# Patient Record
Sex: FEMALE | Race: WHITE | Employment: OTHER | URBAN - METROPOLITAN AREA
[De-identification: names, ages, dates, MRNs, and addresses within clinical notes are randomized per-mention and may not be internally consistent; named-entity substitution may affect disease eponyms.]

---

## 2017-10-10 ENCOUNTER — HOSPITAL ENCOUNTER (OUTPATIENT)
Dept: GENERAL RADIOLOGY | Age: 67
Discharge: HOME | End: 2017-10-10
Attending: FAMILY MEDICINE
Payer: MEDICARE

## 2017-10-10 DIAGNOSIS — M47.812 CERVICAL ARTHRITIS: ICD-10-CM

## 2017-10-10 PROCEDURE — 72050 X-RAY EXAM NECK SPINE 4/5VWS: CPT

## 2022-07-28 ENCOUNTER — DOCUMENTATION ONLY (OUTPATIENT)
Dept: FAMILY MEDICINE CLINIC | Age: 72
End: 2022-07-28

## 2022-07-28 ENCOUNTER — OFFICE VISIT (OUTPATIENT)
Dept: FAMILY MEDICINE CLINIC | Age: 72
End: 2022-07-28
Payer: MEDICARE

## 2022-07-28 VITALS
HEART RATE: 76 BPM | HEIGHT: 62 IN | BODY MASS INDEX: 20.02 KG/M2 | WEIGHT: 108.8 LBS | SYSTOLIC BLOOD PRESSURE: 139 MMHG | TEMPERATURE: 98.1 F | OXYGEN SATURATION: 97 % | DIASTOLIC BLOOD PRESSURE: 77 MMHG

## 2022-07-28 DIAGNOSIS — E78.2 MODERATE MIXED HYPERLIPIDEMIA NOT REQUIRING STATIN THERAPY: ICD-10-CM

## 2022-07-28 DIAGNOSIS — R19.5 CHANGE IN STOOL: ICD-10-CM

## 2022-07-28 DIAGNOSIS — I10 PRIMARY HYPERTENSION: Primary | ICD-10-CM

## 2022-07-28 DIAGNOSIS — Z76.89 ENCOUNTER TO ESTABLISH CARE: ICD-10-CM

## 2022-07-28 LAB
ALBUMIN SERPL-MCNC: 4 G/DL (ref 3.5–5)
ALBUMIN/GLOB SERPL: 1.3 {RATIO} (ref 1.1–2.2)
ALP SERPL-CCNC: 72 U/L (ref 45–117)
ALT SERPL-CCNC: 40 U/L (ref 12–78)
ANION GAP SERPL CALC-SCNC: 4 MMOL/L (ref 5–15)
AST SERPL-CCNC: 22 U/L (ref 15–37)
BASOPHILS # BLD: 0 K/UL (ref 0–0.1)
BASOPHILS NFR BLD: 0 % (ref 0–1)
BILIRUB SERPL-MCNC: 0.6 MG/DL (ref 0.2–1)
BUN SERPL-MCNC: 26 MG/DL (ref 6–20)
BUN/CREAT SERPL: 23 (ref 12–20)
CALCIUM SERPL-MCNC: 9.5 MG/DL (ref 8.5–10.1)
CHLORIDE SERPL-SCNC: 106 MMOL/L (ref 97–108)
CHOLEST SERPL-MCNC: 210 MG/DL
CO2 SERPL-SCNC: 28 MMOL/L (ref 21–32)
CREAT SERPL-MCNC: 1.11 MG/DL (ref 0.55–1.02)
DIFFERENTIAL METHOD BLD: NORMAL
EOSINOPHIL # BLD: 0.1 K/UL (ref 0–0.4)
EOSINOPHIL NFR BLD: 1 % (ref 0–7)
ERYTHROCYTE [DISTWIDTH] IN BLOOD BY AUTOMATED COUNT: 13.4 % (ref 11.5–14.5)
GLOBULIN SER CALC-MCNC: 3.1 G/DL (ref 2–4)
GLUCOSE SERPL-MCNC: 102 MG/DL (ref 65–100)
HCT VFR BLD AUTO: 41.7 % (ref 35–47)
HDLC SERPL-MCNC: 85 MG/DL
HDLC SERPL: 2.5 {RATIO} (ref 0–5)
HGB BLD-MCNC: 13.2 G/DL (ref 11.5–16)
IMM GRANULOCYTES # BLD AUTO: 0 K/UL (ref 0–0.04)
IMM GRANULOCYTES NFR BLD AUTO: 0 % (ref 0–0.5)
LDLC SERPL CALC-MCNC: 109 MG/DL (ref 0–100)
LYMPHOCYTES # BLD: 1 K/UL (ref 0.8–3.5)
LYMPHOCYTES NFR BLD: 20 % (ref 12–49)
MCH RBC QN AUTO: 30 PG (ref 26–34)
MCHC RBC AUTO-ENTMCNC: 31.7 G/DL (ref 30–36.5)
MCV RBC AUTO: 94.8 FL (ref 80–99)
MONOCYTES # BLD: 0.4 K/UL (ref 0–1)
MONOCYTES NFR BLD: 8 % (ref 5–13)
NEUTS SEG # BLD: 3.7 K/UL (ref 1.8–8)
NEUTS SEG NFR BLD: 71 % (ref 32–75)
NRBC # BLD: 0 K/UL (ref 0–0.01)
NRBC BLD-RTO: 0 PER 100 WBC
PLATELET # BLD AUTO: 229 K/UL (ref 150–400)
PMV BLD AUTO: 9.7 FL (ref 8.9–12.9)
POTASSIUM SERPL-SCNC: 4.8 MMOL/L (ref 3.5–5.1)
PROT SERPL-MCNC: 7.1 G/DL (ref 6.4–8.2)
RBC # BLD AUTO: 4.4 M/UL (ref 3.8–5.2)
SODIUM SERPL-SCNC: 138 MMOL/L (ref 136–145)
TRIGL SERPL-MCNC: 80 MG/DL (ref ?–150)
TSH SERPL DL<=0.05 MIU/L-ACNC: 1.07 UIU/ML (ref 0.36–3.74)
VLDLC SERPL CALC-MCNC: 16 MG/DL
WBC # BLD AUTO: 5.2 K/UL (ref 3.6–11)

## 2022-07-28 PROCEDURE — 1123F ACP DISCUSS/DSCN MKR DOCD: CPT | Performed by: FAMILY MEDICINE

## 2022-07-28 PROCEDURE — 99204 OFFICE O/P NEW MOD 45 MIN: CPT | Performed by: FAMILY MEDICINE

## 2022-07-28 PROCEDURE — G0463 HOSPITAL OUTPT CLINIC VISIT: HCPCS | Performed by: FAMILY MEDICINE

## 2022-07-28 RX ORDER — LISINOPRIL 10 MG/1
TABLET ORAL DAILY
COMMUNITY

## 2022-07-28 NOTE — PROGRESS NOTES
Faxed Records Release form to [facility] (fax# [phone]). Confirmation received. Faxed Records Release form to [facility] (fax# [phone]). Confirmation received. Faxed Records Release form to [facility] (fax# [phone]). Confirmation received.

## 2022-07-28 NOTE — PROGRESS NOTES
Harvey Izquierdo is a 71 y.o. female , id x 2(name and ). Reviewed record, history, and  medications. Chief Complaint   Patient presents with    Establish Care     Previous PCP retired. Diarrhea     Pt has no other concerns. Vitals:    22 0742   BP: 139/77   Pulse: 76   Temp: 98.1 °F (36.7 °C)   TempSrc: Oral   SpO2: 97%   Weight: 108 lb 12.8 oz (49.4 kg)   Height: 5' 1.5\" (1.562 m)       Coordination of Care Questionnaire:   1. Have you been to the ER, urgent care clinic since your last visit? Hospitalized since your last visit? Yes The [address] [place] [address] in [facility] Never     2. Have you seen or consulted any other health care providers outside of the [address] [place] [address] since your last visit? Include any pap smears or colon screening.  No

## 2022-07-28 NOTE — PROGRESS NOTES
Suha Burks (: 1950) is a 71 y.o. female, new patient, here for evaluation of the following chief complaint(s):  Establish Care (Previous PCP retired. ) and Diarrhea         ASSESSMENT/PLAN:  Below is the assessment and plan developed based on review of pertinent history, physical exam, labs, studies, and medications. 1. Primary hypertension  Blood pressure is a little high in office   At goal with home monitoring   Continue lisinopril   Check labs today   Continue following DASH diet, low cholesterol diet   -     CBC WITH AUTOMATED DIFF; Future  -     METABOLIC PANEL, COMPREHENSIVE; Future  -     TSH 3RD GENERATION; Future    2. Moderate mixed hyperlipidemia not requiring statin therapy  Fasting labs today   Encourage patient to return to heart healthy, low cholesterol diet as she has not been following this for the last 2 months   -     METABOLIC PANEL, COMPREHENSIVE; Future  -     LIPID PANEL; Future    3. Change in stool  Minimal mucous in stool following bowel movements for last 2 months. Ddx includes secondary to possible COVID 3 months ago vs dietary changes vs thyroid disease vs other  Check labs   Encourage her to return to heart healthy diet. If labs OK and no improvement with dietary changes will refer to GI  -     TSH 3RD GENERATION; Future    4. Encounter to establish care  Records requested today for 2 previous PCPs as well as OBGYN     Return in about 3 months (around 10/28/2022) for follow up with PCP, records review. SUBJECTIVE/OBJECTIVE:  Chief Complaint   Patient presents with    Establish Care     Previous PCP retired. Diarrhea     Patient is a 52-year-old female with history of hypertension presenting the office to establish care. Previously at [address] [place] [address], her personal PCP retired. Seeing Jose Pearce since then at the office. She also went to [facility] for a few months and was seeing a nurse practitioner there.   She loved this nurse practitioner, but she is close to assisted and patient would like to establish with the practice that has providers who are not not about to retire. Hypertension: she has been taking lisinopril for several years. She previously was on hydrochlorothiazide [place] her to lisinopril. Blood pressure is always slightly elevated at the doctor's office. She recently did a 10-day at home blood pressure monitor which showed consistently less than 130/80. Her blood pressure this morning was 126/76. She had a colonoscopy last year- either February or march. She was having some issues with spotting/bleeding in her stool. It was a hemorrhoid. She follows with CHRIS adams for OBGYN, they also did bone scan. Unsure when her last labwork was, at least 9 months ago    Borderline high cholesterol and they wanted to put her on a statin in the past so patient changed her diet. She follows a very strict heart healthy low-cholesterol diet and exercises several days per week. She was able bring her cholesterol down with dietary and lifestyle changes and a statin was not recommended. There was a question of a kidney issue, little high at some point. She was not sent to nephrology, she is unsure exactly what this was. Patient states in the last few months she has a new issue having a stool. she notes about 3 months ago she had a upper respiratory infection, she did tested negative for COVID twice but she had a high fever, aches, fatigue and a cough and the symptoms lasted for about 6 weeks and she is given she had COVID. After this, 3 to 4 days/week after she has a bowel movement when she wipes she will feel a mucousy type consistency to her stool. She also notes she has increased flatulence and occasionally this is accompanied by a scant amount of thin mucus. She has 2 soft stools per day. They are soft, no straining or rectal pain.   She has no diarrhea, abdominal pain, fever, chills, night sweats, weight loss. No vaginal burning or discharge, no urinary frequency dysuria or hematuria. She does note for the last several weeks she has not been following her heart healthy diet as strictly as previously. She has been eating out more. A week ago she went back to her strict heart healthy diet and she has noticed that the stool symptoms have improved in the last week. She has had 1 or 2 episodes of BRBPR last 3 months with known hemorrhoid. Review of Systems   Constitutional:  Negative for chills, fatigue and fever. HENT:  Negative for hearing loss. Eyes:  Negative for pain and visual disturbance. Respiratory:  Negative for cough, chest tightness, shortness of breath and wheezing. Cardiovascular:  Negative for chest pain, palpitations and leg swelling. Gastrointestinal:  Negative for abdominal distention, abdominal pain, constipation, diarrhea, nausea and vomiting. Genitourinary:  Negative for dysuria, flank pain, frequency and hematuria. Musculoskeletal:  Negative for arthralgias, joint swelling and myalgias. Skin:  Negative for rash. Neurological:  Negative for dizziness, weakness, light-headedness and headaches. Psychiatric/Behavioral:  Negative for behavioral problems. The patient is not nervous/anxious. Current Outpatient Medications on File Prior to Visit   Medication Sig Dispense Refill    lisinopriL (PRINIVIL, ZESTRIL) 10 mg tablet Take  by mouth daily. No current facility-administered medications on file prior to visit. There are no problems to display for this patient.   Past Surgical History:   Procedure Laterality Date    HX CATARACT REMOVAL Bilateral 2016    HX SHOULDER ARTHROSCOPY Right 1981     Not on File  Social History     Tobacco Use    Smoking status: Never    Smokeless tobacco: Never   Vaping Use    Vaping Use: Never used   Substance Use Topics    Alcohol use: Yes     Comment: occassionally Drug use: Never     Family History   Problem Relation Age of Onset    Kidney Disease Mother     Colon Cancer Mother     Anemia Mother     Heart Disease Father     Heart Attack Father      Vitals:    07/28/22 0742   BP: 139/77   Pulse: 76   Temp: 98.1 °F (36.7 °C)   TempSrc: Oral   SpO2: 97%   Weight: 108 lb 12.8 oz (49.4 kg)   Height: 5' 1.5\" (1.562 m)   PainSc:   0 - No pain        Physical Exam  Constitutional:       Appearance: Normal appearance. HENT:      Head: Normocephalic and atraumatic. Eyes:      Extraocular Movements: Extraocular movements intact. Conjunctiva/sclera: Conjunctivae normal.      Pupils: Pupils are equal, round, and reactive to light. Cardiovascular:      Rate and Rhythm: Normal rate and regular rhythm. Pulses: Normal pulses. Heart sounds: Normal heart sounds. Pulmonary:      Effort: Pulmonary effort is normal.      Breath sounds: Normal breath sounds. Abdominal:      General: Abdomen is flat. Bowel sounds are normal. There is no distension. Palpations: Abdomen is soft. There is no mass. Tenderness: no abdominal tenderness There is no right CVA tenderness, left CVA tenderness or guarding. Musculoskeletal:      Right lower leg: No edema. Left lower leg: No edema. Neurological:      General: No focal deficit present. Mental Status: She is alert and oriented to person, place, and time. Psychiatric:         Mood and Affect: Mood normal.         Behavior: Behavior normal.         An electronic signature was used to authenticate this note.   -- Kirk Harvey PA-C

## 2022-07-29 ENCOUNTER — DOCUMENTATION ONLY (OUTPATIENT)
Dept: FAMILY MEDICINE CLINIC | Age: 72
End: 2022-07-29

## 2022-07-31 NOTE — PROGRESS NOTES
Please call patient and advise. Her cholesterol is a little high, but not to the level I would recommend starting medication. Return to heart healthy, low-cholesterol diet and we can recheck in 6 to 12 months. Thyroid is normal.    Her labs show her kidney function is slightly decreased and her creatinine is a little high. Creatinine is a measurement of kidney function. I recommend she avoid all NSAIDs (Motrin, Aleve, Advil) and stay well-hydrated and return to office in 1 month to recheck her kidney function. Hopefully we will have her previous PCP records at this point and we can compare these labs to her previous labs. Her blood counts are perfect. Recommend return to office in 1 month for recheck of kidney function and follow-up on stool changes. If stool changes have persisted despite returning to her low-cholesterol diet then will refer to GI.   Naida Pedro PA-C

## 2022-08-01 ENCOUNTER — TELEPHONE (OUTPATIENT)
Dept: FAMILY MEDICINE CLINIC | Age: 72
End: 2022-08-01

## 2022-08-01 NOTE — TELEPHONE ENCOUNTER
----- Message from Shy Stein PA-C sent at 7/31/2022  4:58 PM EDT -----  Please call patient and advise. Her cholesterol is a little high, but not to the level I would recommend starting medication. Return to heart healthy, low-cholesterol diet and we can recheck in 6 to 12 months. Thyroid is normal.    Her labs show her kidney function is slightly decreased and her creatinine is a little high. Creatinine is a measurement of kidney function. I recommend she avoid all NSAIDs (Motrin, Aleve, Advil) and stay well-hydrated and return to office in 1 month to recheck her kidney function. Hopefully we will have her previous PCP records at this point and we can compare these labs to her previous labs. Her blood counts are perfect. Recommend return to office in 1 month for recheck of kidney function and follow-up on stool changes. If stool changes have persisted despite returning to her low-cholesterol diet then will refer to GI.   Shy Stein PA-C

## 2022-08-01 NOTE — TELEPHONE ENCOUNTER
Called and spoke with pt. Pt id x 2 verified. Informed her of the previous message from [place], [place]. She verbalized understanding. Attempted to schedule her for 1 month  f/up appt for recheck kidney function and stool changes. Unsuccessful, unable to access appt schedule at this time. Informed her will call her back to get her scheduled. She verbalized understanding.

## 2022-08-02 NOTE — TELEPHONE ENCOUNTER
Future Appointments   Date Time Provider Gladys Cuellar   8/29/2022  3:15 PM Romario Curtis PA-C [facility]

## 2022-08-02 NOTE — PROGRESS NOTES
Per review of previous PCP and GYN records  Last mammogram 12/31/2022  Last colonoscopy 4/15/2021 with Dr. Karleen Favre hemorrhoids otherwise normal, repeat 5 years   Shingrex completed 10/5/2020

## 2022-08-09 ENCOUNTER — DOCUMENTATION ONLY (OUTPATIENT)
Dept: FAMILY MEDICINE CLINIC | Age: 72
End: 2022-08-09

## 2022-08-09 NOTE — PROGRESS NOTES
[facility] records were put on MICHAEL Senior's desk ( covering paperwork for PA [employer] )

## 2022-08-29 ENCOUNTER — OFFICE VISIT (OUTPATIENT)
Dept: FAMILY MEDICINE CLINIC | Age: 72
End: 2022-08-29
Payer: MEDICARE

## 2022-08-29 VITALS
WEIGHT: 109.9 LBS | HEART RATE: 73 BPM | OXYGEN SATURATION: 98 % | BODY MASS INDEX: 20.22 KG/M2 | TEMPERATURE: 98.2 F | HEIGHT: 62 IN | DIASTOLIC BLOOD PRESSURE: 74 MMHG | RESPIRATION RATE: 18 BRPM | SYSTOLIC BLOOD PRESSURE: 136 MMHG

## 2022-08-29 DIAGNOSIS — Z00.00 MEDICARE ANNUAL WELLNESS VISIT, SUBSEQUENT: Primary | ICD-10-CM

## 2022-08-29 DIAGNOSIS — N18.31 STAGE 3A CHRONIC KIDNEY DISEASE (HCC): ICD-10-CM

## 2022-08-29 DIAGNOSIS — I10 PRIMARY HYPERTENSION: ICD-10-CM

## 2022-08-29 DIAGNOSIS — R19.5 CHANGE IN STOOL: ICD-10-CM

## 2022-08-29 PROCEDURE — G8399 PT W/DXA RESULTS DOCUMENT: HCPCS | Performed by: FAMILY MEDICINE

## 2022-08-29 PROCEDURE — G0439 PPPS, SUBSEQ VISIT: HCPCS | Performed by: FAMILY MEDICINE

## 2022-08-29 PROCEDURE — G8432 DEP SCR NOT DOC, RNG: HCPCS | Performed by: FAMILY MEDICINE

## 2022-08-29 PROCEDURE — G9899 SCRN MAM PERF RSLTS DOC: HCPCS | Performed by: FAMILY MEDICINE

## 2022-08-29 PROCEDURE — G8427 DOCREV CUR MEDS BY ELIG CLIN: HCPCS | Performed by: FAMILY MEDICINE

## 2022-08-29 PROCEDURE — 99213 OFFICE O/P EST LOW 20 MIN: CPT | Performed by: FAMILY MEDICINE

## 2022-08-29 PROCEDURE — 1123F ACP DISCUSS/DSCN MKR DOCD: CPT | Performed by: FAMILY MEDICINE

## 2022-08-29 PROCEDURE — G8536 NO DOC ELDER MAL SCRN: HCPCS | Performed by: FAMILY MEDICINE

## 2022-08-29 PROCEDURE — 1101F PT FALLS ASSESS-DOCD LE1/YR: CPT | Performed by: FAMILY MEDICINE

## 2022-08-29 PROCEDURE — 3017F COLORECTAL CA SCREEN DOC REV: CPT | Performed by: FAMILY MEDICINE

## 2022-08-29 PROCEDURE — G0463 HOSPITAL OUTPT CLINIC VISIT: HCPCS | Performed by: FAMILY MEDICINE

## 2022-08-29 PROCEDURE — G8420 CALC BMI NORM PARAMETERS: HCPCS | Performed by: FAMILY MEDICINE

## 2022-08-29 RX ORDER — DOXYCYCLINE HYCLATE 100 MG
TABLET ORAL
COMMUNITY
Start: 2022-08-15

## 2022-08-29 NOTE — ACP (ADVANCE CARE PLANNING)
Advance Care Planning     General Advance Care Planning (ACP) Conversation      Date of Conversation: 8/29/2022  Conducted with: Patient with Decision Making Capacity    Healthcare Decision Maker:     Primary Decision Maker: Alexia Pearl - [facility] - [phone]  Click here to complete [address] including selection of the Healthcare Decision Maker Relationship (ie \"Primary\")  Today we documented Decision Maker(s) consistent with Legal Next of Kin hierarchy. Content/Action Overview: Has ACP document(s) NOT on file - requested patient to provide  Reviewed DNR/DNI and patient will review and make sure wishes are documented with advanced directive. She has advanced directive and living will and will bring documents by the office for PCP to have on file.      Length of Voluntary ACP Conversation in minutes:  <16 minutes (Non-Billable)    Tirso Handley PA-C

## 2022-08-29 NOTE — PATIENT INSTRUCTIONS
Gastrointestinal specialists: [phone] Gastroenterology: [phone]     Medicare Wellness Visit, Female     The best way to live healthy is to have a lifestyle where you eat a well-balanced diet, exercise regularly, limit alcohol use, and quit all forms of tobacco/nicotine, if applicable. Regular preventive services are another way to keep healthy. Preventive services (vaccines, screening tests, monitoring & exams) can help personalize your care plan, which helps you manage your own care. Screening tests can find health problems at the earliest stages, when they are easiest to treat. [facility] follows the current, evidence-based guidelines published by the [facility] Bruno ([facility]) when recommending preventive services for our patients. Because we follow these guidelines, sometimes recommendations change over time as research supports it. (For example, mammograms used to be recommended annually. Even though Medicare will still pay for an annual mammogram, the newer guidelines recommend a mammogram every two years for women of average risk). Of course, you and your doctor may decide to screen more often for some diseases, based on your risk and your co-morbidities (chronic disease you are already diagnosed with). Preventive services for you include:  - Medicare offers their members a free annual wellness visit, which is time for you and your primary care provider to discuss and plan for your preventive service needs. Take advantage of this benefit every year!  -All adults over the age of 72 should receive the recommended pneumonia vaccines. Current USPSTF guidelines recommend a series of two vaccines for the best pneumonia protection.   -All adults should have a flu vaccine yearly and a tetanus vaccine every 10 years.   -All adults age 48 and older should receive the shingles vaccines (series of two vaccines).       -All adults age 38-68 who are overweight should have a diabetes screening test once every three years.   -All adults born between [birth year] and 1965 should be screened once for Hepatitis C.  -Other screening tests and preventive services for persons with diabetes include: an eye exam to screen for diabetic retinopathy, a kidney function test, a foot exam, and stricter control over your cholesterol.   -Cardiovascular screening for adults with routine risk involves an electrocardiogram (ECG) at intervals determined by your doctor.   -Colorectal cancer screenings should be done for adults age 54-65 with no increased risk factors for colorectal cancer. There are a number of acceptable methods of screening for this type of cancer. Each test has its own benefits and drawbacks. Discuss with your doctor what is most appropriate for you during your annual wellness visit. The different tests include: colonoscopy (considered the best screening method), a fecal occult blood test, a fecal DNA test, and sigmoidoscopy.    -A bone mass density test is recommended when a woman turns 65 to screen for osteoporosis. This test is only recommended one time, as a screening. Some providers will use this same test as a disease monitoring tool if you already have osteoporosis. -Breast cancer screenings are recommended every other year for women of normal risk, age 54-69.  -Cervical cancer screenings for women over age 72 are only recommended with certain risk factors.      Here is a list of your current Health Maintenance items (your personalized list of preventive services) with a due date:  Health Maintenance Due   Topic Date Due    Hepatitis C Test  Never done    COVID-19 Vaccine (1) Never done    Colorectal Screening  Never done    Shingles Vaccine (1 of 2) Never done    Bone Mineral Density   Never done    DTaP/Tdap/Td  (2 - Td or Tdap) 07/23/2020

## 2022-08-29 NOTE — PROGRESS NOTES
Ann Blanco is a 71 y.o. female , id x 2(name and ). Reviewed record, history, and  medications. Chief Complaint   Patient presents with    Follow-up     The bowel issue has gotten a little better. BP this morning was 120/75 and Pulse was 77. Vitals:    22 1532 22 1535   BP: (!) 147/77 136/74   Pulse: 73    Resp: 18    Temp: 98.2 °F (36.8 °C)    TempSrc: Oral    SpO2: 98%    Weight: 109 lb 14.4 oz (49.9 kg)    Height: 5' 1.5\" (1.562 m)        Coordination of Care Questionnaire:   1. Have you been to the ER, urgent care clinic since your last visit? Hospitalized since your last visit? No    2. Have you seen or consulted any other health care providers outside of the [address] [place] [address] since your last visit? Include any pap smears or colon screening. Yes Dr. Sridevi Putnam at [facility].

## 2022-08-29 NOTE — PROGRESS NOTES
[address]ia [address] Terrell [address] (: 1950) is a 71 y.o. female, established patient, here for evaluation of the following chief complaint(s):  Follow-up (The bowel issue has gotten a little better. /BP this morning was 120/75 and Pulse was 77. )     This is the Subsequent Medicare Annual Wellness Exam, performed 12 months or more after the Initial AWV or the last Subsequent AWV    I have reviewed the patient's medical history in detail and updated the computerized patient record. Assessment/Plan   Education and counseling provided:  Are appropriate based on today's review and evaluation  Pneumococcal Vaccine  Influenza Vaccine  Screening Mammography  Screening Pap and pelvic (covered once every 2 years)  Colorectal cancer screening tests  Bone mass measurement (DEXA)    1. Medicare annual wellness visit, subsequent  Up to date on mammo, pap and dexa with OBGYN [facility] [address]  Up to take on prevnar and shingrex   Up to date on colonoscopy     Depression Risk Factor Screening     3 most recent PHQ Screens 2022   Little interest or pleasure in doing things Not at all   Feeling down, depressed, irritable, or hopeless Not at all   Total Score PHQ 2 0       Alcohol & Drug Abuse Risk Screen    Do you average more than 1 drink per night or more than 7 drinks a week:  No    On any one occasion in the past three months have you have had more than 3 drinks containing alcohol:  No          Functional Ability and Level of Safety    Hearing: Hearing is good. Activities of Daily Living: The home contains: handrails and grab bars  Patient does total self care      Ambulation: with no difficulty     Fall Risk:  Fall Risk Assessment, last 12 mths 2022   Able to walk? Yes   Fall in past 12 months? 0   Do you feel unsteady?  0   Are you worried about falling 0      Abuse Screen:  Patient is not abused       Cognitive Screening    Has your family/caregiver stated any concerns about your memory: no     Cognitive Screening: Normal - Mini Cog Test    Health Maintenance Due     Health Maintenance Due   Topic Date Due    Hepatitis C Screening  Never done    COVID-19 Vaccine (1) Never done    Colorectal Cancer Screening Combo  Never done    Shingrix Vaccine Age 50> (1 of 2) Never done    Bone Densitometry (Dexa) Screening  Never done    DTaP/Tdap/Td series (2 - Td or Tdap) 07/23/2020       Patient Care Team   Patient Care Team:  Edd Kelley as PCP - General (Physician Assistant)  Edd Kelley as PCP - [address] [place] [address]  [facility] Provider  Marin Gong MD (Family Medicine)    History     Patient Active Problem List   Diagnosis Code    Primary hypertension I10    Moderate mixed hyperlipidemia not requiring statin therapy E78.2     History reviewed. No pertinent past medical history. Past Surgical History:   Procedure Laterality Date    HX CATARACT REMOVAL Bilateral 2016    HX SHOULDER ARTHROSCOPY Right 1981     Current Outpatient Medications   Medication Sig Dispense Refill    doxycycline (VIBRA-TABS) 100 mg tablet TAKE 1 TABLET BY MOUTH TWICE A DAY FOR 30 DAYS      lisinopriL (PRINIVIL, ZESTRIL) 10 mg tablet Take  by mouth daily.        Not on File    Family History   Problem Relation Age of Onset    Kidney Disease Mother     Colon Cancer Mother     Anemia Mother     Heart Disease Father     Heart Attack Father      Social History     Tobacco Use    Smoking status: Never    Smokeless tobacco: Never   Substance Use Topics    Alcohol use: Yes     Comment: occassionally         Genia Michelle PA-C

## 2022-08-30 NOTE — PROGRESS NOTES
Bibiana Joshua 20 (: 1950) is a 71 y.o. female, established patient, here for evaluation of the following chief complaint(s):  Follow-up (The bowel issue has gotten a little better. /BP this morning was 120/75 and Pulse was 77. )         ASSESSMENT/PLAN:  Below is the assessment and plan developed based on review of pertinent history, physical exam, labs, studies, and medications. 1. Change in stool  Some improvement with avoidance of chocolate and greasy foods however she is still experiencing mucous in stool and incontinence of mucous after passing gas. Refer to GI. She is already established patient at [facility]    2. Stage 3a chronic kidney disease ([address]  Cr improved from 1.2 with labs with previous PCP 8 months ago to 1.11 with labs 1 month ago   Continue to avoid NSAIDS and stay well hydrated   Continue to monitor with q 6 mo labs     3. Primary hypertension   At goal in office continue lisinopril 10mg daily    Return in about 6 months (around 2023). SUBJECTIVE/OBJECTIVE:  Chief Complaint   Patient presents with    Follow-up     The bowel issue has gotten a little better. BP this morning was 120/75 and Pulse was 77. Patient presents to office for change in stool consistency. GI sx have improved some but not resolved. If she has gas occasionally and expell said gas-- liquid comes out sometimes it is a clear mucous, other times yellow liquid. All started after COVID-like URI 2 months ago with negative COVID tests. She has tried to return to heart healthy strict diet but has not completely made these changes. She is trying to see if certain foods trigger GI symptoms. Chocolate definitely triggers mucous in stool. Sometimes its a mucous, othertimes thin and yellow. Still happening over the last month. Most every day. She had a colonoscopy a year and a half ago. RGSIMEON Lopez.        She does not want statin for hyperlipidemia     Previous hep c testing with previous PCP     Has had both pneumonia and both shingles-- prevnar last one in may     Per review of previous PCP and GYN records  Last mammogram 12/31/2022  Last colonoscopy 4/15/2021 with Dr. Dominik Kendall hemorrhoids otherwise normal, repeat 5 years   Shingrex completed 10/5/2020  DEXA by OBGYN over a year ago    Review of systems negative other than mentioned in HPI    Current Outpatient Medications on File Prior to Visit   Medication Sig Dispense Refill    doxycycline (VIBRA-TABS) 100 mg tablet TAKE 1 TABLET BY MOUTH TWICE A DAY FOR 30 DAYS      lisinopriL (PRINIVIL, ZESTRIL) 10 mg tablet Take  by mouth daily. No current facility-administered medications on file prior to visit. Vitals:    08/29/22 1532 08/29/22 1535   BP: (!) 147/77 136/74   Pulse: 73    Resp: 18    Temp: 98.2 °F (36.8 °C)    TempSrc: Oral    SpO2: 98%    Weight: 109 lb 14.4 oz (49.9 kg)    Height: 5' 1.5\" (1.562 m)    PainSc:   0 - No pain         Physical Exam  Constitutional:       Appearance: Normal appearance. HENT:      Head: Normocephalic and atraumatic. Eyes:      Extraocular Movements: Extraocular movements intact. Conjunctiva/sclera: Conjunctivae normal.      Pupils: Pupils are equal, round, and reactive to light. Cardiovascular:      Rate and Rhythm: Normal rate and regular rhythm. Pulses: Normal pulses. Heart sounds: Normal heart sounds. Pulmonary:      Effort: Pulmonary effort is normal.      Breath sounds: Normal breath sounds. Abdominal:      General: Abdomen is flat. Bowel sounds are normal.      Palpations: Abdomen is soft. Neurological:      Mental Status: She is alert. Psychiatric:         Mood and Affect: Mood normal.         Behavior: Behavior normal.       An electronic signature was used to authenticate this note.       -- Rosy Hinojosa PA-C

## 2023-01-04 ENCOUNTER — TELEPHONE (OUTPATIENT)
Dept: FAMILY MEDICINE CLINIC | Age: 73
End: 2023-01-04

## 2023-01-04 NOTE — TELEPHONE ENCOUNTER
Called and spoke with pt. Pt id x 2. She states she's unsure but she thinks she need blood work done for her blood pressure medication. She would like to schedule an appt. Appt scheduled for 1/9/23 at 8:40 am. She verbalized understanding.

## 2023-01-04 NOTE — TELEPHONE ENCOUNTER
----- Message from [employer] sent at 1/4/2023  2:05 PM EST -----  Subject: Referral Request    Reason for referral request? lab work for blood pressure  Provider patient wants to be referred to(if known):     Provider Phone Number(if known): Additional Information for Provider?  please call when orders have been put   in for this  ---------------------------------------------------------------------------  --------------  [id number] [employer]    [phone]; OK to leave message on voicemail  ---------------------------------------------------------------------------  --------------

## 2023-01-09 ENCOUNTER — OFFICE VISIT (OUTPATIENT)
Dept: FAMILY MEDICINE CLINIC | Age: 73
End: 2023-01-09
Payer: MEDICARE

## 2023-01-09 VITALS
HEIGHT: 62 IN | OXYGEN SATURATION: 98 % | SYSTOLIC BLOOD PRESSURE: 138 MMHG | RESPIRATION RATE: 16 BRPM | DIASTOLIC BLOOD PRESSURE: 74 MMHG | BODY MASS INDEX: 20.72 KG/M2 | HEART RATE: 70 BPM | WEIGHT: 112.6 LBS | TEMPERATURE: 97.9 F

## 2023-01-09 DIAGNOSIS — N18.31 STAGE 3A CHRONIC KIDNEY DISEASE (HCC): ICD-10-CM

## 2023-01-09 DIAGNOSIS — I10 PRIMARY HYPERTENSION: Primary | ICD-10-CM

## 2023-01-09 LAB
ANION GAP SERPL CALC-SCNC: 3 MMOL/L (ref 5–15)
BUN SERPL-MCNC: 22 MG/DL (ref 6–20)
BUN/CREAT SERPL: 21 (ref 12–20)
CALCIUM SERPL-MCNC: 9.4 MG/DL (ref 8.5–10.1)
CHLORIDE SERPL-SCNC: 107 MMOL/L (ref 97–108)
CO2 SERPL-SCNC: 28 MMOL/L (ref 21–32)
CREAT SERPL-MCNC: 1.05 MG/DL (ref 0.55–1.02)
GLUCOSE SERPL-MCNC: 97 MG/DL (ref 65–100)
POTASSIUM SERPL-SCNC: 4.6 MMOL/L (ref 3.5–5.1)
SODIUM SERPL-SCNC: 138 MMOL/L (ref 136–145)

## 2023-01-09 PROCEDURE — 1101F PT FALLS ASSESS-DOCD LE1/YR: CPT | Performed by: FAMILY MEDICINE

## 2023-01-09 PROCEDURE — 3078F DIAST BP <80 MM HG: CPT | Performed by: FAMILY MEDICINE

## 2023-01-09 PROCEDURE — G8399 PT W/DXA RESULTS DOCUMENT: HCPCS | Performed by: FAMILY MEDICINE

## 2023-01-09 PROCEDURE — G8432 DEP SCR NOT DOC, RNG: HCPCS | Performed by: FAMILY MEDICINE

## 2023-01-09 PROCEDURE — G8427 DOCREV CUR MEDS BY ELIG CLIN: HCPCS | Performed by: FAMILY MEDICINE

## 2023-01-09 PROCEDURE — G8420 CALC BMI NORM PARAMETERS: HCPCS | Performed by: FAMILY MEDICINE

## 2023-01-09 PROCEDURE — 1090F PRES/ABSN URINE INCON ASSESS: CPT | Performed by: FAMILY MEDICINE

## 2023-01-09 PROCEDURE — 99214 OFFICE O/P EST MOD 30 MIN: CPT | Performed by: FAMILY MEDICINE

## 2023-01-09 PROCEDURE — 1123F ACP DISCUSS/DSCN MKR DOCD: CPT | Performed by: FAMILY MEDICINE

## 2023-01-09 PROCEDURE — 3075F SYST BP GE 130 - 139MM HG: CPT | Performed by: FAMILY MEDICINE

## 2023-01-09 PROCEDURE — 3017F COLORECTAL CA SCREEN DOC REV: CPT | Performed by: FAMILY MEDICINE

## 2023-01-09 PROCEDURE — G0463 HOSPITAL OUTPT CLINIC VISIT: HCPCS | Performed by: FAMILY MEDICINE

## 2023-01-09 PROCEDURE — G8536 NO DOC ELDER MAL SCRN: HCPCS | Performed by: FAMILY MEDICINE

## 2023-01-09 RX ORDER — LISINOPRIL 10 MG/1
10 TABLET ORAL DAILY
Qty: 90 TABLET | Refills: 3 | Status: SHIPPED | OUTPATIENT
Start: 2023-01-09

## 2023-01-09 NOTE — PROGRESS NOTES
Dg Riggins is a 71 y.o. female , id x 2(name and ). Reviewed record, history, and  medications. Chief Complaint   Patient presents with    Follow-up    Hypertension    Labs    Other     Had surgery (removal of anal polyp) at [facility] on 22. Has cleared up her bowel issues. Vitals:    23 0845   BP: 138/74   Pulse: 70   Resp: 16   Temp: 97.9 °F (36.6 °C)   TempSrc: Oral   SpO2: 98%   Weight: 112 lb 9.6 oz (51.1 kg)   Height: 5' 1.5\" (1.562 m)       Coordination of Care Questionnaire:   1. Have you been to the ER, urgent care clinic since your last visit? Hospitalized since your last visit? No    2. Have you seen or consulted any other health care providers outside of the [address] [place] [address] since your last visit? Include any pap smears or colon screening. Yes Dermatologist- [facility]          Social Determinants of Health     Tobacco Use: Low Risk     Smoking Tobacco Use: Never    Smokeless Tobacco Use: Never    Passive Exposure: Not on file   Alcohol Use: Not on file   Financial Resource Strain: Low Risk     Difficulty of Paying Living Expenses: Not hard at all   Food Insecurity: No Food Insecurity    Worried About Running Out of Food in the Last Year: Never true    Ran Out of Food in the Last Year: Never true   Transportation Needs: No Transportation Needs    Lack of Transportation (Medical): No    Lack of Transportation (Non-Medical):  No   Physical Activity: Not on file   Stress: Not on file   Social Connections: Not on file   Intimate Partner Violence: Not on file   Depression: Not at risk    PHQ-2 Score: 0   Housing Stability: Low Risk     Unable to Pay for Housing in the Last Year: No    Number of Places Lived in the Last Year: 1    Unstable Housing in the Last Year: No

## 2023-01-09 NOTE — PROGRESS NOTES
Lazaro Eagle (: 1950) is a 71 y.o. female, established patient, here for evaluation of the following chief complaint(s):  Follow-up, Hypertension, Labs, and Other (Had surgery (removal of anal polyp) at [facility] on 22./Has cleared up her bowel issues. /)         ASSESSMENT/PLAN:  Below is the assessment and plan developed based on review of pertinent history, physical exam, labs, studies, and medications. 1. Primary hypertension  BP at goal in office and with home monitoring   Update labs today   Continue lisinopril   -     lisinopriL (PRINIVIL, ZESTRIL) 10 mg tablet; Take 1 Tablet by mouth daily. , Normal, Disp-90 Tablet, R-3  -     METABOLIC PANEL, BASIC; Future    2. Stage 3a chronic kidney disease (HCC)  Bmp for stability   Avoid nephrotoxic meds   -     METABOLIC PANEL, BASIC; Future    Follow up 6 mo sooner pending labs       SUBJECTIVE/OBJECTIVE:  Chief Complaint   Patient presents with    Follow-up    Hypertension    Labs    Other     Had surgery (removal of anal polyp) at [facility] on 22. Has cleared up her bowel issues. Patient is a 78yo female with HTN and hyperlipidemia presenting to office for follow up. For HTN, she is compliant with lisinopril 10mg per day. BP at home this AM was 105/73. No chest pain, SOB, lower extremity swelling, cough, swelling of tongue or lips. She did see Gi specialist for change in bowel function who referred her to Dr. Chavez mendez [facility] and she had anal polyp removed. Since then her bowel function has returned to normal. This procedure was in December and she goes for follow up . She will now have colonoscopy every 3 years, per patient they recommended next in  (last was ). Dermatologist started her on doxycycline for rosacea. She takes once per day right now and it controls rosacea well. No other concerns.  She is feeling well and is looking forward to a  month trip to [place] next month. Review of systems negative other than mentioned in HPI    Current Outpatient Medications on File Prior to Visit   Medication Sig Dispense Refill    doxycycline (VIBRA-TABS) 100 mg tablet 100 mg daily. For rosacea      [DISCONTINUED] lisinopriL (PRINIVIL, ZESTRIL) 10 mg tablet Take  by mouth daily. No current facility-administered medications on file prior to visit. Patient Active Problem List    Diagnosis Date Noted    Primary hypertension 07/28/2022    Moderate mixed hyperlipidemia not requiring statin therapy 07/28/2022     No Known Allergies  Past Surgical History:   Procedure Laterality Date    HX CATARACT REMOVAL Bilateral 2016    HX SHOULDER ARTHROSCOPY Right 1981     Social History     Tobacco Use    Smoking status: Never    Smokeless tobacco: Never   Vaping Use    Vaping Use: Never used   Substance Use Topics    Alcohol use: Yes     Comment: occassionally    Drug use: Never     Family History   Problem Relation Age of Onset    Kidney Disease Mother     Colon Cancer Mother     Anemia Mother     Heart Disease Father     Heart Attack Father      Vitals:    01/09/23 0845   BP: 138/74   Pulse: 70   Resp: 16   Temp: 97.9 °F (36.6 °C)   TempSrc: Oral   SpO2: 98%   Weight: 112 lb 9.6 oz (51.1 kg)   Height: 5' 1.5\" (1.562 m)   PainSc:   0 - No pain        Physical Exam  Constitutional:       Appearance: Normal appearance. HENT:      Head: Normocephalic and atraumatic. Eyes:      Extraocular Movements: Extraocular movements intact. Conjunctiva/sclera: Conjunctivae normal.      Pupils: Pupils are equal, round, and reactive to light. Cardiovascular:      Rate and Rhythm: Normal rate and regular rhythm. Pulses: Normal pulses. Heart sounds: Normal heart sounds. Pulmonary:      Effort: Pulmonary effort is normal.      Breath sounds: Normal breath sounds. Abdominal:      General: Abdomen is flat. Bowel sounds are normal.      Palpations: Abdomen is soft. Musculoskeletal:      Cervical back: Normal range of motion and neck supple. No rigidity or tenderness. Right lower leg: No edema. Left lower leg: No edema. Lymphadenopathy:      Cervical: No cervical adenopathy. Skin:     Capillary Refill: Capillary refill takes less than 2 seconds. Neurological:      General: No focal deficit present. Mental Status: She is alert and oriented to person, place, and time. Psychiatric:         Mood and Affect: Mood normal.         Behavior: Behavior normal.         An electronic signature was used to authenticate this note.   -- Laila Kim PA-C

## 2023-01-11 NOTE — PROGRESS NOTES
[place] message sent to patient with results: Your kidney function is stable and your electrolytes are all normal.   Continue with the plan we discussed in office! Have fun on your trip!   Audrey Zuniga PA-C

## 2023-01-30 ENCOUNTER — DOCUMENTATION ONLY (OUTPATIENT)
Dept: FAMILY MEDICINE CLINIC | Age: 73
End: 2023-01-30

## 2023-03-28 LAB — MAMMOGRAPHY, EXTERNAL: NORMAL

## 2023-06-27 ENCOUNTER — TELEPHONE (OUTPATIENT)
Age: 73
End: 2023-06-27

## 2023-08-22 ENCOUNTER — OFFICE VISIT (OUTPATIENT)
Age: 73
End: 2023-08-22
Payer: MEDICARE

## 2023-08-22 VITALS
BODY MASS INDEX: 20.83 KG/M2 | DIASTOLIC BLOOD PRESSURE: 76 MMHG | SYSTOLIC BLOOD PRESSURE: 115 MMHG | OXYGEN SATURATION: 99 % | HEART RATE: 64 BPM | HEIGHT: 62 IN | TEMPERATURE: 98.3 F | WEIGHT: 113.2 LBS

## 2023-08-22 DIAGNOSIS — E78.2 MODERATE MIXED HYPERLIPIDEMIA NOT REQUIRING STATIN THERAPY: ICD-10-CM

## 2023-08-22 DIAGNOSIS — M85.89 OSTEOPENIA OF MULTIPLE SITES: ICD-10-CM

## 2023-08-22 DIAGNOSIS — M18.11 ARTHRITIS OF CARPOMETACARPAL (CMC) JOINT OF RIGHT THUMB: Primary | ICD-10-CM

## 2023-08-22 DIAGNOSIS — I10 PRIMARY HYPERTENSION: ICD-10-CM

## 2023-08-22 LAB
ERYTHROCYTE [DISTWIDTH] IN BLOOD BY AUTOMATED COUNT: 12.7 % (ref 11.5–14.5)
HCT VFR BLD AUTO: 37.9 % (ref 35–47)
HGB BLD-MCNC: 11.9 G/DL (ref 11.5–16)
MCH RBC QN AUTO: 29.5 PG (ref 26–34)
MCHC RBC AUTO-ENTMCNC: 31.4 G/DL (ref 30–36.5)
MCV RBC AUTO: 93.8 FL (ref 80–99)
NRBC # BLD: 0 K/UL (ref 0–0.01)
NRBC BLD-RTO: 0 PER 100 WBC
PLATELET # BLD AUTO: 218 K/UL (ref 150–400)
PMV BLD AUTO: 10 FL (ref 8.9–12.9)
RBC # BLD AUTO: 4.04 M/UL (ref 3.8–5.2)
WBC # BLD AUTO: 5.3 K/UL (ref 3.6–11)

## 2023-08-22 PROCEDURE — 1123F ACP DISCUSS/DSCN MKR DOCD: CPT | Performed by: STUDENT IN AN ORGANIZED HEALTH CARE EDUCATION/TRAINING PROGRAM

## 2023-08-22 PROCEDURE — 3078F DIAST BP <80 MM HG: CPT | Performed by: STUDENT IN AN ORGANIZED HEALTH CARE EDUCATION/TRAINING PROGRAM

## 2023-08-22 PROCEDURE — 3017F COLORECTAL CA SCREEN DOC REV: CPT | Performed by: STUDENT IN AN ORGANIZED HEALTH CARE EDUCATION/TRAINING PROGRAM

## 2023-08-22 PROCEDURE — G8420 CALC BMI NORM PARAMETERS: HCPCS | Performed by: STUDENT IN AN ORGANIZED HEALTH CARE EDUCATION/TRAINING PROGRAM

## 2023-08-22 PROCEDURE — G8427 DOCREV CUR MEDS BY ELIG CLIN: HCPCS | Performed by: STUDENT IN AN ORGANIZED HEALTH CARE EDUCATION/TRAINING PROGRAM

## 2023-08-22 PROCEDURE — 1090F PRES/ABSN URINE INCON ASSESS: CPT | Performed by: STUDENT IN AN ORGANIZED HEALTH CARE EDUCATION/TRAINING PROGRAM

## 2023-08-22 PROCEDURE — 3074F SYST BP LT 130 MM HG: CPT | Performed by: STUDENT IN AN ORGANIZED HEALTH CARE EDUCATION/TRAINING PROGRAM

## 2023-08-22 PROCEDURE — 1036F TOBACCO NON-USER: CPT | Performed by: STUDENT IN AN ORGANIZED HEALTH CARE EDUCATION/TRAINING PROGRAM

## 2023-08-22 PROCEDURE — 99214 OFFICE O/P EST MOD 30 MIN: CPT | Performed by: STUDENT IN AN ORGANIZED HEALTH CARE EDUCATION/TRAINING PROGRAM

## 2023-08-22 PROCEDURE — G8400 PT W/DXA NO RESULTS DOC: HCPCS | Performed by: STUDENT IN AN ORGANIZED HEALTH CARE EDUCATION/TRAINING PROGRAM

## 2023-08-22 NOTE — ASSESSMENT & PLAN NOTE
DEXA done at outside facility, pt reports osteopenia  She is currently taking a vitamin D supplement, dose unknown  Reassess with labs and consider dose adjustment based on results

## 2023-08-22 NOTE — PROGRESS NOTES
Progress Note    she is a 68y.o. year old female who presents for evaluation of \[facility]\"" Care (Former [facility] pt ) and [facility] (Is fasting )        Assessment/ Plan:     1. Arthritis of carpometacarpal (CMC) joint of right thumb  Assessment & Plan:  Discussed options for management. Currently using a topical CBD treatment. Provided with handout of exercises. Provided with referral in case of persistent issues  Orders:  -     [facility] - Ceci Clay MD, Orthopedic Surgery (elbow, hand, wrist), [place]  2. Osteopenia of multiple sites  Assessment & Plan:  DEXA done at outside facility, pt reports osteopenia  She is currently taking a vitamin D supplement, dose unknown  Reassess with labs and consider dose adjustment based on results  Orders:  -     Vitamin D 25 Hydroxy; Future  3. Primary hypertension  Assessment & Plan:  Elevated in office, but at home blood pressure is Well-controlled, continue current medications: Lisinopril 10 mg  Orders:  -     CBC; Future  -     Comprehensive Metabolic Panel; Future  -     TSH; Future  4. Moderate mixed hyperlipidemia not requiring statin therapy  -     Lipid Panel; Future        Patient is fasting for labs today. Return in about 6 months (around 2024) for follow-up blood pressure with labs. I have discussed the diagnosis with the patient and the intended plan as seen in the above orders. The patient has received an after-visit summary and questions were answered concerning future plans. Pt conveyed understanding of plan. Medication Side Effects and Warnings were discussed with patient        Subjective:     Chief Complaint   Patient presents with    \[facility]\"" Care     Former [facility] pt     Lab Collection     Is fasting      Home monitoring 115/76  Not previous brought cuff in to be checked.   No issues with side effects from lisinopril    Family history of elevated cholesterol  Dad  of an MI at the age of 68    Doxycycline is taken prn for

## 2023-08-22 NOTE — ASSESSMENT & PLAN NOTE
Elevated in office, but at home blood pressure is Well-controlled, continue current medications: Lisinopril 10 mg

## 2023-08-22 NOTE — ASSESSMENT & PLAN NOTE
Discussed options for management. Currently using a topical CBD treatment. Provided with handout of exercises.   Provided with referral in case of persistent issues

## 2023-08-23 LAB
25(OH)D3 SERPL-MCNC: 63 NG/ML (ref 30–100)
ALBUMIN SERPL-MCNC: 3.9 G/DL (ref 3.5–5)
ALBUMIN/GLOB SERPL: 1.5 (ref 1.1–2.2)
ALP SERPL-CCNC: 70 U/L (ref 45–117)
ALT SERPL-CCNC: 28 U/L (ref 12–78)
ANION GAP SERPL CALC-SCNC: 4 MMOL/L (ref 5–15)
AST SERPL-CCNC: 23 U/L (ref 15–37)
BILIRUB SERPL-MCNC: 0.6 MG/DL (ref 0.2–1)
BUN SERPL-MCNC: 22 MG/DL (ref 6–20)
BUN/CREAT SERPL: 20 (ref 12–20)
CALCIUM SERPL-MCNC: 8.8 MG/DL (ref 8.5–10.1)
CHLORIDE SERPL-SCNC: 109 MMOL/L (ref 97–108)
CHOLEST SERPL-MCNC: 199 MG/DL
CO2 SERPL-SCNC: 25 MMOL/L (ref 21–32)
CREAT SERPL-MCNC: 1.08 MG/DL (ref 0.55–1.02)
GLOBULIN SER CALC-MCNC: 2.6 G/DL (ref 2–4)
GLUCOSE SERPL-MCNC: 90 MG/DL (ref 65–100)
HDLC SERPL-MCNC: 67 MG/DL
HDLC SERPL: 3 (ref 0–5)
LDLC SERPL CALC-MCNC: 110.8 MG/DL (ref 0–100)
POTASSIUM SERPL-SCNC: 3.9 MMOL/L (ref 3.5–5.1)
PROT SERPL-MCNC: 6.5 G/DL (ref 6.4–8.2)
SODIUM SERPL-SCNC: 138 MMOL/L (ref 136–145)
TRIGL SERPL-MCNC: 106 MG/DL
TSH SERPL DL<=0.05 MIU/L-ACNC: 1.13 UIU/ML (ref 0.36–3.74)
VLDLC SERPL CALC-MCNC: 21.2 MG/DL

## 2023-09-25 ENCOUNTER — TELEPHONE (OUTPATIENT)
Age: 73
End: 2023-09-25

## 2023-09-25 NOTE — TELEPHONE ENCOUNTER
Patient requesting lab results via mail. Address is current in the system.  Patient's phone: [phone]

## 2024-03-21 ENCOUNTER — TELEPHONE (OUTPATIENT)
Age: 74
End: 2024-03-21

## 2024-03-21 NOTE — TELEPHONE ENCOUNTER
We received a fax refill request for Pamela S Dance.  Please escribe Lisinopril to their pharmacy.  The pharmacy is correct in the chart and they are requesting a 90 day supply.  Refill: 3

## 2024-10-18 PROCEDURE — 99282 EMERGENCY DEPT VISIT SF MDM: CPT

## 2024-10-19 ENCOUNTER — HOSPITAL ENCOUNTER (EMERGENCY)
Facility: HOSPITAL | Age: 74
Discharge: HOME | End: 2024-10-19
Attending: EMERGENCY MEDICINE
Payer: MEDICARE

## 2024-10-19 VITALS
DIASTOLIC BLOOD PRESSURE: 81 MMHG | TEMPERATURE: 97.8 F | BODY MASS INDEX: 20.43 KG/M2 | HEIGHT: 61 IN | WEIGHT: 108.2 LBS | HEART RATE: 82 BPM | OXYGEN SATURATION: 100 % | RESPIRATION RATE: 12 BRPM | SYSTOLIC BLOOD PRESSURE: 179 MMHG

## 2024-10-19 DIAGNOSIS — H69.93 DYSFUNCTION OF BOTH EUSTACHIAN TUBES: Primary | ICD-10-CM

## 2024-10-19 DIAGNOSIS — H92.03 OTALGIA OF BOTH EARS: ICD-10-CM

## 2024-10-19 ASSESSMENT — PAIN DESCRIPTION - ORIENTATION: ORIENTATION: LEFT;RIGHT

## 2024-10-19 ASSESSMENT — ENCOUNTER SYMPTOMS
SORE THROAT: 0
COUGH: 0
NAUSEA: 0
DIARRHEA: 0
EYE PAIN: 0
FACIAL SWELLING: 0
BACK PAIN: 0
EYE DISCHARGE: 0
SHORTNESS OF BREATH: 0
VOMITING: 0
CHEST TIGHTNESS: 0
RHINORRHEA: 0
ABDOMINAL PAIN: 0

## 2024-10-19 ASSESSMENT — LIFESTYLE VARIABLES
HOW OFTEN DO YOU HAVE A DRINK CONTAINING ALCOHOL: NEVER
HOW MANY STANDARD DRINKS CONTAINING ALCOHOL DO YOU HAVE ON A TYPICAL DAY: PATIENT DOES NOT DRINK

## 2024-10-19 ASSESSMENT — PAIN - FUNCTIONAL ASSESSMENT: PAIN_FUNCTIONAL_ASSESSMENT: 0-10

## 2024-10-19 ASSESSMENT — PAIN DESCRIPTION - LOCATION: LOCATION: EAR

## 2024-10-19 ASSESSMENT — PAIN DESCRIPTION - DESCRIPTORS: DESCRIPTORS: ACHING

## 2024-10-19 ASSESSMENT — PAIN SCALES - GENERAL: PAINLEVEL_OUTOF10: 5

## 2024-10-19 ASSESSMENT — PAIN DESCRIPTION - PAIN TYPE: TYPE: ACUTE PAIN

## 2024-10-19 NOTE — ED PROVIDER NOTES
[facility] EMERGENCY DEPT  EMERGENCY DEPARTMENT ENCOUNTER      Pt Name: Pamela S Dance  MRN: [id number]  Birthdate 1950  Date of evaluation: 10/18/2024  Provider: Ignacio Young MD    CHIEF COMPLAINT       Chief Complaint   Patient presents with    Otalgia         HISTORY OF PRESENT ILLNESS   (Location/Symptom, Timing/Onset, Context/Setting, Quality, Duration, Modifying Factors, Severity)  Note limiting factors.   74-year-old female with bilateral ear pressure radiating into her neck.  She has a sensation that she cannot pop her ears.  No fever.  No sore throat.  No cough.    The history is provided by the patient.   Ear Problem  Location:  Bilateral  Behind ear:  No abnormality  Quality:  Dull and pressure  Severity:  Mild  Onset quality:  Gradual  Duration:  3 days  Timing:  Constant  Progression:  Worsening  Chronicity:  New  Context: not direct blow, not elevation change, not foreign body in ear, not loud noise, not recent URI and not water in ear    Relieved by:  Nothing  Worsened by:  Nothing  Ineffective treatments:  None tried  Associated symptoms: no abdominal pain, no congestion, no cough, no diarrhea, no ear discharge, no fever, no headaches, no hearing loss, no neck pain, no rash, no rhinorrhea, no sore throat, no tinnitus and no vomiting          Review of External Medical Records:     Nursing Notes were reviewed.    REVIEW OF SYSTEMS    (2-9 systems for level 4, 10 or more for level 5)     Review of Systems   Constitutional:  Negative for activity change, appetite change, chills, diaphoresis and fever.   HENT:  Negative for congestion, ear discharge, ear pain, facial swelling, hearing loss, rhinorrhea, sore throat and tinnitus.    Eyes:  Negative for pain, discharge and visual disturbance.   Respiratory:  Negative for cough, chest tightness and shortness of breath.    Cardiovascular:  Negative for chest pain and palpitations.   Gastrointestinal:  Negative for abdominal pain, diarrhea, nausea and  Insecurity (1/16/2024)    Received from [facility]    Hunger Vital Sign     Worried About Running Out of Food in the Last Year: Never true     Ran Out of Food in the Last Year: Never true   Transportation Needs: No Transportation Needs (1/16/2024)    Received from [facility]    PRAPARE - Transportation     Lack of Transportation (Medical): No     Lack of Transportation (Non-Medical): No   Housing Stability: Low Risk  (1/16/2024)    Received from [facility]    Housing Stability Vital Sign     Unable to Pay for Housing in the Last Year: No     Number of Places Lived in the Last Year: 1     Unstable Housing in the Last Year: No           PHYSICAL EXAM    (up to 7 for level 4, 8 or more for level 5)     ED Triage Vitals [10/19/24 0009]   BP Systolic BP Percentile Diastolic BP Percentile Temp Temp Source Pulse Respirations SpO2   (!) 179/81 -- -- 97.8 °F (36.6 °C) Oral 82 12 100 %      Height Weight - Scale         1.549 m (5' 1\") 49.1 kg (108 lb 3.2 oz)             Body mass index is 20.44 kg/m².    Physical Exam  Vitals and nursing note reviewed.   Constitutional:       Appearance: She is normal weight.   HENT:      Head: Normocephalic and atraumatic.   Eyes:      Extraocular Movements: Extraocular movements intact.      Conjunctiva/sclera: Conjunctivae normal.      Pupils: Pupils are equal, round, and reactive to light.   Cardiovascular:      Rate and Rhythm: Normal rate and regular rhythm.      Pulses: Normal pulses.      Heart sounds: Normal heart sounds. No murmur heard.  Pulmonary:      Effort: Pulmonary effort is normal. No respiratory distress.      Breath sounds: No stridor. No wheezing.   Abdominal:      General: Abdomen is flat. Bowel sounds are normal. There is no distension.      Palpations: Abdomen is soft.      Tenderness: There is no abdominal tenderness.   Musculoskeletal:         General: Normal range of motion.      Cervical back: Normal range of motion. No

## 2024-10-19 NOTE — ED TRIAGE NOTES
Pt presents with c/o bilateral ear pain that is radiating down into her neck, onset this afternoon, no drainage, no fevers, no headache , no dizziness or vision changes, speech is clear; pt only recent travel by car to [place] over a week ago;   States hx of this pain last year after covid, had this pain in the right ear  Pt ambulatory with steady gait; took 2 tylenol at 2245